# Patient Record
Sex: MALE | Race: WHITE
[De-identification: names, ages, dates, MRNs, and addresses within clinical notes are randomized per-mention and may not be internally consistent; named-entity substitution may affect disease eponyms.]

---

## 2020-08-19 LAB
BASOPHILS NFR BLD AUTO: 0.5 % (ref 0–5)
BUN SERPL-MCNC: 43 MG/DL (ref 7–18)
CHLORIDE SERPL-SCNC: 104 MMOL/L (ref 101–111)
CO2 SERPL-SCNC: 25 MMOL/L (ref 21–32)
CREAT SERPL-MCNC: 1.8 MG/DL (ref 0.5–1.5)
EOSINOPHIL NFR BLD AUTO: 3.6 % (ref 0–8)
ERYTHROCYTE [DISTWIDTH] IN BLOOD BY AUTOMATED COUNT: 14.3 % (ref 11–15.5)
GFR SERPL CREATININE-BSD FRML MDRD: 38 ML/MIN (ref 60–?)
GLUCOSE SERPL-MCNC: 111 MG/DL (ref 70–105)
HCT VFR BLD AUTO: 29 % (ref 42–54)
LYMPHOCYTES NFR SPEC AUTO: 30.3 % (ref 21–51)
MCH RBC QN AUTO: 34 PG (ref 27–33)
MCHC RBC AUTO-ENTMCNC: 34.1 G/DL (ref 32–36)
MCV RBC AUTO: 99.7 FL (ref 79–99)
MONOCYTES NFR BLD AUTO: 4.3 % (ref 3–13)
NEUTROPHILS NFR BLD AUTO: 61 % (ref 40–77)
NRBC BLD MANUAL-RTO: 0 % (ref 0–0.19)
PLATELET # BLD AUTO: 145 K/UL (ref 130–400)
POTASSIUM SERPL-SCNC: 4.6 MMOL/L (ref 3.5–5.1)
RBC # BLD AUTO: 2.91 MIL/UL (ref 4.5–6.2)
SODIUM SERPL-SCNC: 137 MMOL/L (ref 136–145)
WBC # BLD AUTO: 3.9 K/UL (ref 4.8–10.8)

## 2020-08-21 VITALS — DIASTOLIC BLOOD PRESSURE: 63 MMHG | SYSTOLIC BLOOD PRESSURE: 151 MMHG

## 2020-08-21 NOTE — NUR
RE: MEDICATIONS



INFORMED DR BYRD REGARDING PATIENT TAKING CLOPIDOGREL AND CILOSTAZOL THIS MORNING. PER DR BYRD, PATIENT CAN TAKE THOSE MEDICATIONS UP UNTIL TOMORROW AND THEN HE NEEDS TO HOLD THEM. 
MAY PROCEED WITH SCHEDULED SURGERY AS ORDERED. INFORMED AND INSTRUCTED PATIENT OF THIS, AND 
PATIENT VERBALIZED UNDERSTANDING.

## 2020-08-21 NOTE — NUR
LABS

INFORMED DR. SORIANO OF ABNORMAL WBC/H/H/BUN/CREA. NO ORDERS RECEIVED. PROCEED WITH PLANNED 
PROCEDURE.

## 2020-08-24 ENCOUNTER — HOSPITAL ENCOUNTER (OUTPATIENT)
Dept: HOSPITAL 90 - DAH | Age: 85
Discharge: HOME | End: 2020-08-24
Attending: ORTHOPAEDIC SURGERY
Payer: OTHER GOVERNMENT

## 2020-08-24 VITALS — DIASTOLIC BLOOD PRESSURE: 79 MMHG | SYSTOLIC BLOOD PRESSURE: 162 MMHG

## 2020-08-24 VITALS — DIASTOLIC BLOOD PRESSURE: 62 MMHG | SYSTOLIC BLOOD PRESSURE: 149 MMHG

## 2020-08-24 VITALS — SYSTOLIC BLOOD PRESSURE: 159 MMHG | DIASTOLIC BLOOD PRESSURE: 69 MMHG

## 2020-08-24 VITALS — DIASTOLIC BLOOD PRESSURE: 62 MMHG | SYSTOLIC BLOOD PRESSURE: 147 MMHG

## 2020-08-24 VITALS — BODY MASS INDEX: 35.24 KG/M2 | WEIGHT: 260.2 LBS | HEIGHT: 72 IN

## 2020-08-24 VITALS — DIASTOLIC BLOOD PRESSURE: 65 MMHG | SYSTOLIC BLOOD PRESSURE: 151 MMHG

## 2020-08-24 VITALS — SYSTOLIC BLOOD PRESSURE: 155 MMHG | DIASTOLIC BLOOD PRESSURE: 71 MMHG

## 2020-08-24 VITALS — SYSTOLIC BLOOD PRESSURE: 153 MMHG | DIASTOLIC BLOOD PRESSURE: 65 MMHG

## 2020-08-24 VITALS — DIASTOLIC BLOOD PRESSURE: 67 MMHG | SYSTOLIC BLOOD PRESSURE: 152 MMHG

## 2020-08-24 VITALS — SYSTOLIC BLOOD PRESSURE: 143 MMHG | DIASTOLIC BLOOD PRESSURE: 58 MMHG

## 2020-08-24 VITALS — SYSTOLIC BLOOD PRESSURE: 150 MMHG | DIASTOLIC BLOOD PRESSURE: 65 MMHG

## 2020-08-24 VITALS — SYSTOLIC BLOOD PRESSURE: 140 MMHG | DIASTOLIC BLOOD PRESSURE: 71 MMHG

## 2020-08-24 VITALS — SYSTOLIC BLOOD PRESSURE: 148 MMHG | DIASTOLIC BLOOD PRESSURE: 62 MMHG

## 2020-08-24 VITALS — DIASTOLIC BLOOD PRESSURE: 64 MMHG | SYSTOLIC BLOOD PRESSURE: 148 MMHG

## 2020-08-24 DIAGNOSIS — G56.03: Primary | ICD-10-CM

## 2020-08-24 DIAGNOSIS — E66.01: ICD-10-CM

## 2020-08-24 DIAGNOSIS — Z87.820: ICD-10-CM

## 2020-08-24 DIAGNOSIS — Z82.49: ICD-10-CM

## 2020-08-24 DIAGNOSIS — I25.10: ICD-10-CM

## 2020-08-24 DIAGNOSIS — I12.9: ICD-10-CM

## 2020-08-24 DIAGNOSIS — Z20.828: ICD-10-CM

## 2020-08-24 DIAGNOSIS — M19.90: ICD-10-CM

## 2020-08-24 DIAGNOSIS — Z95.5: ICD-10-CM

## 2020-08-24 DIAGNOSIS — Z72.89: ICD-10-CM

## 2020-08-24 DIAGNOSIS — Z95.0: ICD-10-CM

## 2020-08-24 DIAGNOSIS — K21.9: ICD-10-CM

## 2020-08-24 DIAGNOSIS — N18.3: ICD-10-CM

## 2020-08-24 DIAGNOSIS — Z90.49: ICD-10-CM

## 2020-08-24 DIAGNOSIS — Z79.82: ICD-10-CM

## 2020-08-24 DIAGNOSIS — M10.9: ICD-10-CM

## 2020-08-24 DIAGNOSIS — Z98.890: ICD-10-CM

## 2020-08-24 DIAGNOSIS — Z82.69: ICD-10-CM

## 2020-08-24 DIAGNOSIS — Z79.899: ICD-10-CM

## 2020-08-24 DIAGNOSIS — E11.51: ICD-10-CM

## 2020-08-24 DIAGNOSIS — Z87.891: ICD-10-CM

## 2020-08-24 DIAGNOSIS — E11.22: ICD-10-CM

## 2020-08-24 PROCEDURE — 82948 REAGENT STRIP/BLOOD GLUCOSE: CPT

## 2020-08-24 PROCEDURE — 36415 COLL VENOUS BLD VENIPUNCTURE: CPT

## 2020-08-24 PROCEDURE — 80048 BASIC METABOLIC PNL TOTAL CA: CPT

## 2020-08-24 PROCEDURE — 64721 CARPAL TUNNEL SURGERY: CPT

## 2020-08-24 PROCEDURE — 93005 ELECTROCARDIOGRAM TRACING: CPT

## 2020-08-24 PROCEDURE — 85025 COMPLETE CBC W/AUTO DIFF WBC: CPT

## 2020-10-07 LAB
BASOPHILS NFR BLD AUTO: 0.6 % (ref 0–5)
BUN SERPL-MCNC: 27 MG/DL (ref 7–18)
CHLORIDE SERPL-SCNC: 104 MMOL/L (ref 101–111)
CO2 SERPL-SCNC: 24 MMOL/L (ref 21–32)
CREAT SERPL-MCNC: 1.6 MG/DL (ref 0.5–1.5)
EOSINOPHIL NFR BLD AUTO: 4 % (ref 0–8)
ERYTHROCYTE [DISTWIDTH] IN BLOOD BY AUTOMATED COUNT: 13.4 % (ref 11–15.5)
GFR SERPL CREATININE-BSD FRML MDRD: 44 ML/MIN (ref 60–?)
GLUCOSE SERPL-MCNC: 131 MG/DL (ref 70–105)
HCT VFR BLD AUTO: 28.3 % (ref 42–54)
INR PPP: 0.93 (ref 0.85–1.15)
LYMPHOCYTES NFR SPEC AUTO: 27.7 % (ref 21–51)
MCH RBC QN AUTO: 34.8 PG (ref 27–33)
MCHC RBC AUTO-ENTMCNC: 34.6 G/DL (ref 32–36)
MCV RBC AUTO: 100.4 FL (ref 79–99)
MONOCYTES NFR BLD AUTO: 4.3 % (ref 3–13)
NEUTROPHILS NFR BLD AUTO: 62.8 % (ref 40–77)
NRBC BLD MANUAL-RTO: 0 % (ref 0–0.19)
PLATELET # BLD AUTO: 124 K/UL (ref 130–400)
POTASSIUM SERPL-SCNC: 4.4 MMOL/L (ref 3.5–5.1)
PROTHROMBIN TIME: 10.1 SEC (ref 9.6–11.6)
RBC # BLD AUTO: 2.82 MIL/UL (ref 4.5–6.2)
SODIUM SERPL-SCNC: 136 MMOL/L (ref 136–145)
WBC # BLD AUTO: 3.3 K/UL (ref 4.8–10.8)

## 2020-10-14 ENCOUNTER — HOSPITAL ENCOUNTER (OUTPATIENT)
Dept: HOSPITAL 90 - DAH | Age: 85
Discharge: HOME | End: 2020-10-14
Attending: ORTHOPAEDIC SURGERY
Payer: OTHER GOVERNMENT

## 2020-10-14 VITALS — DIASTOLIC BLOOD PRESSURE: 59 MMHG | SYSTOLIC BLOOD PRESSURE: 132 MMHG

## 2020-10-14 VITALS — DIASTOLIC BLOOD PRESSURE: 62 MMHG | SYSTOLIC BLOOD PRESSURE: 140 MMHG

## 2020-10-14 VITALS — DIASTOLIC BLOOD PRESSURE: 65 MMHG | SYSTOLIC BLOOD PRESSURE: 142 MMHG

## 2020-10-14 VITALS — SYSTOLIC BLOOD PRESSURE: 104 MMHG | DIASTOLIC BLOOD PRESSURE: 45 MMHG

## 2020-10-14 VITALS — SYSTOLIC BLOOD PRESSURE: 141 MMHG | DIASTOLIC BLOOD PRESSURE: 63 MMHG

## 2020-10-14 VITALS — SYSTOLIC BLOOD PRESSURE: 144 MMHG | DIASTOLIC BLOOD PRESSURE: 64 MMHG

## 2020-10-14 VITALS — SYSTOLIC BLOOD PRESSURE: 139 MMHG | DIASTOLIC BLOOD PRESSURE: 58 MMHG

## 2020-10-14 VITALS — WEIGHT: 259.8 LBS | HEIGHT: 72 IN | BODY MASS INDEX: 35.19 KG/M2

## 2020-10-14 VITALS — DIASTOLIC BLOOD PRESSURE: 57 MMHG | SYSTOLIC BLOOD PRESSURE: 131 MMHG

## 2020-10-14 VITALS — DIASTOLIC BLOOD PRESSURE: 54 MMHG | SYSTOLIC BLOOD PRESSURE: 139 MMHG

## 2020-10-14 VITALS — DIASTOLIC BLOOD PRESSURE: 56 MMHG | SYSTOLIC BLOOD PRESSURE: 134 MMHG

## 2020-10-14 VITALS — SYSTOLIC BLOOD PRESSURE: 147 MMHG | DIASTOLIC BLOOD PRESSURE: 71 MMHG

## 2020-10-14 VITALS — SYSTOLIC BLOOD PRESSURE: 140 MMHG | DIASTOLIC BLOOD PRESSURE: 61 MMHG

## 2020-10-14 VITALS — SYSTOLIC BLOOD PRESSURE: 115 MMHG | DIASTOLIC BLOOD PRESSURE: 60 MMHG

## 2020-10-14 VITALS — SYSTOLIC BLOOD PRESSURE: 142 MMHG | DIASTOLIC BLOOD PRESSURE: 58 MMHG

## 2020-10-14 VITALS — SYSTOLIC BLOOD PRESSURE: 150 MMHG | DIASTOLIC BLOOD PRESSURE: 68 MMHG

## 2020-10-14 VITALS — DIASTOLIC BLOOD PRESSURE: 59 MMHG | SYSTOLIC BLOOD PRESSURE: 129 MMHG

## 2020-10-14 VITALS — SYSTOLIC BLOOD PRESSURE: 124 MMHG | DIASTOLIC BLOOD PRESSURE: 62 MMHG

## 2020-10-14 DIAGNOSIS — Z20.828: ICD-10-CM

## 2020-10-14 DIAGNOSIS — G56.03: Primary | ICD-10-CM

## 2020-10-14 DIAGNOSIS — Z79.01: ICD-10-CM

## 2020-10-14 PROCEDURE — 64721 CARPAL TUNNEL SURGERY: CPT

## 2020-10-14 PROCEDURE — 36415 COLL VENOUS BLD VENIPUNCTURE: CPT

## 2020-10-14 PROCEDURE — 80048 BASIC METABOLIC PNL TOTAL CA: CPT

## 2020-10-14 PROCEDURE — 85025 COMPLETE CBC W/AUTO DIFF WBC: CPT

## 2020-10-14 PROCEDURE — 85610 PROTHROMBIN TIME: CPT

## 2020-10-14 PROCEDURE — 82948 REAGENT STRIP/BLOOD GLUCOSE: CPT

## 2020-10-14 PROCEDURE — 93005 ELECTROCARDIOGRAM TRACING: CPT

## 2021-05-07 ENCOUNTER — HOSPITAL ENCOUNTER (OUTPATIENT)
Dept: HOSPITAL 90 - DAH | Age: 86
Discharge: HOME | End: 2021-05-07
Attending: THORACIC SURGERY (CARDIOTHORACIC VASCULAR SURGERY)
Payer: OTHER GOVERNMENT

## 2021-05-07 VITALS — WEIGHT: 260 LBS | HEIGHT: 73.5 IN | BODY MASS INDEX: 33.72 KG/M2

## 2021-05-07 VITALS — SYSTOLIC BLOOD PRESSURE: 152 MMHG | DIASTOLIC BLOOD PRESSURE: 65 MMHG

## 2021-05-07 DIAGNOSIS — Z79.01: ICD-10-CM

## 2021-05-07 DIAGNOSIS — I73.9: Primary | ICD-10-CM

## 2021-05-07 DIAGNOSIS — Z20.822: ICD-10-CM

## 2021-05-07 DIAGNOSIS — Z53.8: ICD-10-CM

## 2021-05-07 LAB
APTT PPP: 25.4 SEC (ref 26.3–35.5)
BASOPHILS NFR BLD AUTO: 0.4 % (ref 0–5)
BUN SERPL-MCNC: 32 MG/DL (ref 7–18)
CHLORIDE SERPL-SCNC: 103 MMOL/L (ref 101–111)
CO2 SERPL-SCNC: 25 MMOL/L (ref 21–32)
CREAT SERPL-MCNC: 1.6 MG/DL (ref 0.5–1.5)
EOSINOPHIL NFR BLD AUTO: 4.8 % (ref 0–8)
EOSINOPHIL NFR BLD MANUAL: 3 % (ref 1–6)
ERYTHROCYTE [DISTWIDTH] IN BLOOD BY AUTOMATED COUNT: 15.1 % (ref 11–15.5)
GFR SERPL CREATININE-BSD FRML MDRD: 44 ML/MIN (ref 60–?)
GLUCOSE SERPL-MCNC: 128 MG/DL (ref 70–105)
HCT VFR BLD AUTO: 26.9 % (ref 42–54)
INR PPP: 0.99 (ref 0.85–1.15)
LYMPHOCYTES NFR BLD MANUAL: 20 % (ref 22–44)
LYMPHOCYTES NFR SPEC AUTO: 30.2 % (ref 21–51)
MANUAL DIF COMMENT BLD-IMP: (no result)
MCH RBC QN AUTO: 34.9 PG (ref 27–33)
MCHC RBC AUTO-ENTMCNC: 33.1 G/DL (ref 32–36)
MCV RBC AUTO: 105.5 FL (ref 79–99)
MONOCYTES NFR BLD AUTO: 5.2 % (ref 3–13)
MONOCYTES NFR BLD MANUAL: 3 % (ref 2–9)
NEUTROPHILS NFR BLD AUTO: 59 % (ref 40–77)
NEUTS SEG NFR BLD MANUAL: 74 % (ref 40–70)
NRBC BLD MANUAL-RTO: 0 % (ref 0–0.19)
PLAT MORPH BLD: (no result)
PLATELET # BLD AUTO: 87 K/UL (ref 130–400)
POTASSIUM SERPL-SCNC: 4.4 MMOL/L (ref 3.5–5.1)
PROTHROMBIN TIME: 10.8 SEC (ref 9.6–11.6)
RBC # BLD AUTO: 2.55 MIL/UL (ref 4.5–6.2)
RBC MORPH BLD: (no result)
SODIUM SERPL-SCNC: 136 MMOL/L (ref 136–145)
WBC # BLD AUTO: 2.5 K/UL (ref 4.8–10.8)

## 2021-05-07 PROCEDURE — 85610 PROTHROMBIN TIME: CPT

## 2021-05-07 PROCEDURE — 87426 SARSCOV CORONAVIRUS AG IA: CPT

## 2021-05-07 PROCEDURE — 80053 COMPREHEN METABOLIC PANEL: CPT

## 2021-05-07 PROCEDURE — 83880 ASSAY OF NATRIURETIC PEPTIDE: CPT

## 2021-05-07 PROCEDURE — 86900 BLOOD TYPING SEROLOGIC ABO: CPT

## 2021-05-07 PROCEDURE — 71046 X-RAY EXAM CHEST 2 VIEWS: CPT

## 2021-05-07 PROCEDURE — 85730 THROMBOPLASTIN TIME PARTIAL: CPT

## 2021-05-07 PROCEDURE — 85025 COMPLETE CBC W/AUTO DIFF WBC: CPT

## 2021-05-07 PROCEDURE — 86901 BLOOD TYPING SEROLOGIC RH(D): CPT

## 2021-05-07 PROCEDURE — 93005 ELECTROCARDIOGRAM TRACING: CPT

## 2021-05-07 PROCEDURE — 86850 RBC ANTIBODY SCREEN: CPT

## 2021-05-07 PROCEDURE — 36415 COLL VENOUS BLD VENIPUNCTURE: CPT

## 2021-05-07 PROCEDURE — 80048 BASIC METABOLIC PNL TOTAL CA: CPT

## 2021-05-10 VITALS — SYSTOLIC BLOOD PRESSURE: 162 MMHG | DIASTOLIC BLOOD PRESSURE: 67 MMHG

## 2021-05-10 LAB
ALBUMIN SERPL-MCNC: 3.3 G/DL (ref 3.5–5)
ALT SERPL-CCNC: 25 U/L (ref 12–78)
AST SERPL-CCNC: 15 U/L (ref 10–37)
BILIRUB SERPL-MCNC: 0.3 MG/DL (ref 0.2–1)
BUN SERPL-MCNC: 33 MG/DL (ref 7–18)
CHLORIDE SERPL-SCNC: 104 MMOL/L (ref 101–111)
CO2 SERPL-SCNC: 24 MMOL/L (ref 21–32)
CREAT SERPL-MCNC: 1.5 MG/DL (ref 0.5–1.5)
GFR SERPL CREATININE-BSD FRML MDRD: 47 ML/MIN (ref 60–?)
GLUCOSE SERPL-MCNC: 121 MG/DL (ref 70–105)
POTASSIUM SERPL-SCNC: 4 MMOL/L (ref 3.5–5.1)
PROT SERPL-MCNC: 6.4 G/DL (ref 6–8.3)
SODIUM SERPL-SCNC: 137 MMOL/L (ref 136–145)